# Patient Record
(demographics unavailable — no encounter records)

---

## 2025-01-30 NOTE — PHYSICAL EXAM
[2+] : left foot dorsalis pedis 2+ [No Joint Swelling] : no joint swelling [Normal Foot/Ankle] : Both lower extremities were exposed and visualized. Standing exam demonstrates normal foot posture and alignment. Hindfoot exam shows no hindfoot valgus or varus [Skin Color & Pigmentation] : normal skin color and pigmentation [] : no rash [Skin Lesions] : no skin lesions [Sensation] : the sensory exam was normal to light touch and pinprick [No Focal Deficits] : no focal deficits [Deep Tendon Reflexes (DTR)] : deep tendon reflexes were 2+ and symmetric [Motor Exam] : the motor exam was normal [Vibration Dec.] : diminished vibratory sensation at the level of the toes [Position Sense Dec.] : diminished position sense at the level of the toes [Delayed in the Right Toes] : capillary refills normal in right toes [Delayed in the Left Toes] : capillary refills normal in the left toes [FreeTextEntry3] : Temperature gradient: warm to cool. [de-identified] : No pain on palpation at the plantar calcaneal tubercles.  ROM of all pedal joints intact, non-painful, non-crepitant, without any restrictions.  Muscle power: 5/5, all pedal groups.   [FreeTextEntry1] : Left plantar forefoot: xerotic skin with no fissures or circular scaling. Bilateral pinch callus is present.  No open lesions, no clinical signs of bacterial infection.  Nails left 1, 2, 4 and 5, right 1, 4 and 5 are thickened up to 3mm with distal subungual debris and yellowing, splintering nails.  Nails x 10 are elongated.    [Diminished Throughout Right Foot] : normal sensation with monofilament testing throughout right foot [Diminished Throughout Left Foot] : normal sensation with monofilament testing throughout left foot

## 2025-01-30 NOTE — HISTORY OF PRESENT ILLNESS
[Sneakers] : roberto [FreeTextEntry1] : Patient returns today for follow up of onychomycotic nails.  Patient states that he has been using topical Ciclopirox with some improvement in the color but he has run out since his last appointment.  Patient no longer feels pain in his right heel.  The plantar fasciitis has resolved.   Patient is diabetic.  Fingerstick this morning was 95.  Last A1c was 5.6%.  He last saw his PCP 3 months ago.  Denies any new medical changes.

## 2025-01-30 NOTE — PHYSICAL EXAM
[2+] : left foot dorsalis pedis 2+ [No Joint Swelling] : no joint swelling [Normal Foot/Ankle] : Both lower extremities were exposed and visualized. Standing exam demonstrates normal foot posture and alignment. Hindfoot exam shows no hindfoot valgus or varus [Skin Color & Pigmentation] : normal skin color and pigmentation [] : no rash [Skin Lesions] : no skin lesions [Sensation] : the sensory exam was normal to light touch and pinprick [No Focal Deficits] : no focal deficits [Deep Tendon Reflexes (DTR)] : deep tendon reflexes were 2+ and symmetric [Motor Exam] : the motor exam was normal [Vibration Dec.] : diminished vibratory sensation at the level of the toes [Position Sense Dec.] : diminished position sense at the level of the toes [Delayed in the Right Toes] : capillary refills normal in right toes [Delayed in the Left Toes] : capillary refills normal in the left toes [FreeTextEntry3] : Temperature gradient: warm to cool. [de-identified] : No pain on palpation at the plantar calcaneal tubercles.  ROM of all pedal joints intact, non-painful, non-crepitant, without any restrictions.  Muscle power: 5/5, all pedal groups.   [FreeTextEntry1] : Left plantar forefoot: xerotic skin with no fissures or circular scaling. Bilateral pinch callus is present.  No open lesions, no clinical signs of bacterial infection.  Nails left 1, 2, 4 and 5, right 1, 4 and 5 are thickened up to 3mm with distal subungual debris and yellowing, splintering nails.  Nails x 10 are elongated.    [Diminished Throughout Right Foot] : normal sensation with monofilament testing throughout right foot [Diminished Throughout Left Foot] : normal sensation with monofilament testing throughout left foot

## 2025-01-30 NOTE — PHYSICAL EXAM
[2+] : left foot dorsalis pedis 2+ [No Joint Swelling] : no joint swelling [Normal Foot/Ankle] : Both lower extremities were exposed and visualized. Standing exam demonstrates normal foot posture and alignment. Hindfoot exam shows no hindfoot valgus or varus [Skin Color & Pigmentation] : normal skin color and pigmentation [] : no rash [Skin Lesions] : no skin lesions [Sensation] : the sensory exam was normal to light touch and pinprick [No Focal Deficits] : no focal deficits [Deep Tendon Reflexes (DTR)] : deep tendon reflexes were 2+ and symmetric [Motor Exam] : the motor exam was normal [Vibration Dec.] : diminished vibratory sensation at the level of the toes [Position Sense Dec.] : diminished position sense at the level of the toes [Delayed in the Right Toes] : capillary refills normal in right toes [Delayed in the Left Toes] : capillary refills normal in the left toes [FreeTextEntry3] : Temperature gradient: warm to cool. [de-identified] : No pain on palpation at the plantar calcaneal tubercles.  ROM of all pedal joints intact, non-painful, non-crepitant, without any restrictions.  Muscle power: 5/5, all pedal groups.   [FreeTextEntry1] : Left plantar forefoot: xerotic skin with no fissures or circular scaling. Bilateral pinch callus is present.  No open lesions, no clinical signs of bacterial infection.  Nails left 1, 2, 4 and 5, right 1, 4 and 5 are thickened up to 3mm with distal subungual debris and yellowing, splintering nails.  Nails x 10 are elongated.    [Diminished Throughout Right Foot] : normal sensation with monofilament testing throughout right foot [Diminished Throughout Left Foot] : normal sensation with monofilament testing throughout left foot

## 2025-01-30 NOTE — ASSESSMENT
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  Onychomycosis (B35.1).  Xerosis (L85.3).  Callus (L84).  Treatment:  For xerosis: Prescribed Ammonium Lactate for the patient.  Explained proper usage.   Discussed the importance of glycemic control, diabetic foot care and neuropathic precautions.  For onychomycosis: Represcribed Ciclopirox.  Advised patient to use it consistently for best results.  Advised patient to clean fomites.  Nails x 6 were debrided of excessive thickness and length to appropriate levels of comfort and contour using sterile nippers and Dremel.   The procedure was tolerated well without complications.   Bilateral calluses (2) were wiped with alcohol and trimmed and reduced uneventfully with sterile #23 blade.   Return: 3 months for routine care.

## 2025-05-22 NOTE — HISTORY OF PRESENT ILLNESS
[FreeTextEntry1] : Patient presents today for follow-up of onychomycotic nails. He has been using topical Ciclopirox and does report pain in the corner of the left hallucal nail. He denies any redness, swelling or drainage. Patient's fingerstick today was 95. Last A1c was 6.9. He follows with PCP every 3 months. Denies any new medical changes. Language line  for Telugu, Ahsan ID# [id number] was present the entire encounter.

## 2025-05-22 NOTE — PHYSICAL EXAM
[2+] : right foot dorsalis pedis 2+ [Skin Color & Pigmentation] : normal skin color and pigmentation [Skin Turgor] : normal skin turgor [] : no rash [Skin Lesions] : no skin lesions [No Focal Deficits] : no focal deficits [Deep Tendon Reflexes (DTR)] : deep tendon reflexes were 2+ and symmetric [Motor Exam] : the motor exam was normal [Vibration Dec.] : diminished vibratory sensation at the level of the toes [Position Sense Dec.] : diminished position sense at the level of the toes [Delayed in the Right Toes] : capillary refills normal in right toes [Delayed in the Left Toes] : capillary refills normal in the left toes [FreeTextEntry3] : Temperature gradient warm to cool.  [de-identified] : ROM of all pedal joints intact, non-painful, non-crepitant, without any restrictions. Muscle power: 5/5, all pedal groups., no joint swelling and normal strength/tone . Both lower extremities were exposed and visualized. Standing exam demonstrates normal foot posture and alignment. Hindfoot exam shows no hindfoot valgus or varus. [FreeTextEntry1] : No xerosis. Bilateral hallux pinched callus present. No open lesions or clinical signs of bacterial infection. Nails left 1, 2, 4 and 5, right 1, 4 and 5 are thickened up to 3mm with distal subungual debris and yellowing, splintering nails. Nails x 10 are elongated. [Diminished Throughout Right Foot] : normal sensation with monofilament testing throughout right foot [Diminished Throughout Left Foot] : normal sensation with monofilament testing throughout left foot

## 2025-05-22 NOTE — PHYSICAL EXAM
[2+] : right foot dorsalis pedis 2+ [Skin Color & Pigmentation] : normal skin color and pigmentation [Skin Turgor] : normal skin turgor [] : no rash [Skin Lesions] : no skin lesions [No Focal Deficits] : no focal deficits [Deep Tendon Reflexes (DTR)] : deep tendon reflexes were 2+ and symmetric [Motor Exam] : the motor exam was normal [Vibration Dec.] : diminished vibratory sensation at the level of the toes [Position Sense Dec.] : diminished position sense at the level of the toes [Delayed in the Right Toes] : capillary refills normal in right toes [Delayed in the Left Toes] : capillary refills normal in the left toes [FreeTextEntry3] : Temperature gradient warm to cool.  [de-identified] : ROM of all pedal joints intact, non-painful, non-crepitant, without any restrictions. Muscle power: 5/5, all pedal groups., no joint swelling and normal strength/tone . Both lower extremities were exposed and visualized. Standing exam demonstrates normal foot posture and alignment. Hindfoot exam shows no hindfoot valgus or varus. [FreeTextEntry1] : No xerosis. Bilateral hallux pinched callus present. No open lesions or clinical signs of bacterial infection. Nails left 1, 2, 4 and 5, right 1, 4 and 5 are thickened up to 3mm with distal subungual debris and yellowing, splintering nails. Nails x 10 are elongated. [Diminished Throughout Right Foot] : normal sensation with monofilament testing throughout right foot [Diminished Throughout Left Foot] : normal sensation with monofilament testing throughout left foot

## 2025-05-22 NOTE — HISTORY OF PRESENT ILLNESS
[FreeTextEntry1] : Patient presents today for follow-up of onychomycotic nails. He has been using topical Ciclopirox and does report pain in the corner of the left hallucal nail. He denies any redness, swelling or drainage. Patient's fingerstick today was 95. Last A1c was 6.9. He follows with PCP every 3 months. Denies any new medical changes. Language line  for Yoruba, Ahsan ID# [id number] was present the entire encounter.

## 2025-05-22 NOTE — PHYSICAL EXAM
[2+] : right foot dorsalis pedis 2+ [Skin Color & Pigmentation] : normal skin color and pigmentation [Skin Turgor] : normal skin turgor [] : no rash [Skin Lesions] : no skin lesions [No Focal Deficits] : no focal deficits [Deep Tendon Reflexes (DTR)] : deep tendon reflexes were 2+ and symmetric [Motor Exam] : the motor exam was normal [Vibration Dec.] : diminished vibratory sensation at the level of the toes [Position Sense Dec.] : diminished position sense at the level of the toes [Delayed in the Right Toes] : capillary refills normal in right toes [Delayed in the Left Toes] : capillary refills normal in the left toes [FreeTextEntry3] : Temperature gradient warm to cool.  [de-identified] : ROM of all pedal joints intact, non-painful, non-crepitant, without any restrictions. Muscle power: 5/5, all pedal groups., no joint swelling and normal strength/tone . Both lower extremities were exposed and visualized. Standing exam demonstrates normal foot posture and alignment. Hindfoot exam shows no hindfoot valgus or varus. [FreeTextEntry1] : No xerosis. Bilateral hallux pinched callus present. No open lesions or clinical signs of bacterial infection. Nails left 1, 2, 4 and 5, right 1, 4 and 5 are thickened up to 3mm with distal subungual debris and yellowing, splintering nails. Nails x 10 are elongated. [Diminished Throughout Right Foot] : normal sensation with monofilament testing throughout right foot [Diminished Throughout Left Foot] : normal sensation with monofilament testing throughout left foot

## 2025-05-22 NOTE — HISTORY OF PRESENT ILLNESS
[FreeTextEntry1] : Patient presents today for follow-up of onychomycotic nails. He has been using topical Ciclopirox and does report pain in the corner of the left hallucal nail. He denies any redness, swelling or drainage. Patient's fingerstick today was 95. Last A1c was 6.9. He follows with PCP every 3 months. Denies any new medical changes. Language line  for Divehi, Ahsan ID# [id number] was present the entire encounter.

## 2025-05-22 NOTE — ASSESSMENT
[FreeTextEntry1] : Impression: Diabetes with neuropathy. Onychomycosis. Callus.   Treatment: Discussed the importance of glycemic control, diabetic foot care and neuropathic precautions. Patient is wearing adequately fitted shoe gear. For onychomycosis continue using topical Ciclopirox consistently. It was represcribed. Continue to clean fomites. Nails x6 were debrided of excessive thickness and length to appropriate levels of comfort and contour using sterile nippers and Dremel. The procedure was tolerated well without complications. Bilateral calluses (2) were wiped with alcohol and trimmed and reduced uneventfully with sterile #23 blade. Will see patient back in 3 months.